# Patient Record
(demographics unavailable — no encounter records)

---

## 2024-10-22 NOTE — HISTORY OF PRESENT ILLNESS
[FreeTextEntry1] : Follow-up [de-identified] : 54 yo M with a hx of insulin dependent DM2, WPW, b/l hearing loss, and weight loss recently hospitalized 5/27-6/4 for DKA and L facial herpes zoster (V1 and V2 dermatome, tx with IV acyclovir followed by PO valacyclovir) with re-presentation on 6/20-6/24 for continued facial rash with pain c/f post-herpetic neuralgia (seen by ID and ophtho, no acute concerns, started on erythromycin eye ointment to left eye BID, and discharged) presenting for follow-up appt.  Today, Mr. Campbell is feeling mostly well. The pain in the left side of his face has decreased greatly since he was hospitalized, although it is still numb with intermittent episodes of pain. Patient has been attending all appointments and trying to take his medications as prescribed, although he states it feels "overwhelming". He was noted to have been feeling depressed at his last visit. Today, he feels less depressed and more hopeful as he is further removed from his hospital stay. However, he continues to experience some "brain fog" which really has not improved since the last time he was seen, He also states that he has not been sleeping the best due to intermittent pain on the left side of his face (in the dermatomes he had shingles). He does not want to try therapy or medication for depression at the moment. Patient states he has been taking his medications as prescribed aside from the midodrine (prescribed for orthostatic hypotension). Although it is prescribed as 3 times a day he states he was told to take it "once a day", and has instead been taking it 1-2 times per week. Patient notes only occasional lightheadedness on this regimen. In regards to his diabetes, he tries to make healthy food choices and has been walking around the neighborhood more recently.

## 2024-10-22 NOTE — HISTORY OF PRESENT ILLNESS
[FreeTextEntry1] : Follow-up [de-identified] : 54 yo M with a hx of insulin dependent DM2, WPW, b/l hearing loss, and weight loss recently hospitalized 5/27-6/4 for DKA and L facial herpes zoster (V1 and V2 dermatome, tx with IV acyclovir followed by PO valacyclovir) with re-presentation on 6/20-6/24 for continued facial rash with pain c/f post-herpetic neuralgia (seen by ID and ophtho, no acute concerns, started on erythromycin eye ointment to left eye BID, and discharged) presenting for follow-up appt.  Today, Mr. Campbell is feeling mostly well. The pain in the left side of his face has decreased greatly since he was hospitalized, although it is still numb with intermittent episodes of pain. Patient has been attending all appointments and trying to take his medications as prescribed, although he states it feels "overwhelming". He was noted to have been feeling depressed at his last visit. Today, he feels less depressed and more hopeful as he is further removed from his hospital stay. However, he continues to experience some "brain fog" which really has not improved since the last time he was seen, He also states that he has not been sleeping the best due to intermittent pain on the left side of his face (in the dermatomes he had shingles). He does not want to try therapy or medication for depression at the moment. Patient states he has been taking his medications as prescribed aside from the midodrine (prescribed for orthostatic hypotension). Although it is prescribed as 3 times a day he states he was told to take it "once a day", and has instead been taking it 1-2 times per week. Patient notes only occasional lightheadedness on this regimen. In regards to his diabetes, he tries to make healthy food choices and has been walking around the neighborhood more recently.

## 2024-10-22 NOTE — PHYSICAL EXAM
[No Acute Distress] : no acute distress [Cachectic] : cachexia was observed [Normal Sclera/Conjunctiva] : normal sclera/conjunctiva [EOMI] : extraocular movements intact [Normal Outer Ear/Nose] : the outer ears and nose were normal in appearance [No Respiratory Distress] : no respiratory distress  [No Accessory Muscle Use] : no accessory muscle use [Clear to Auscultation] : lungs were clear to auscultation bilaterally [Normal Rate] : normal rate  [Regular Rhythm] : with a regular rhythm [No Murmur] : no murmur heard [No Edema] : there was no peripheral edema [Soft] : abdomen soft [Non Tender] : non-tender [Non-distended] : non-distended [Grossly Normal Strength/Tone] : grossly normal strength/tone [No Rash] : no rash [Coordination Grossly Intact] : coordination grossly intact [No Focal Deficits] : no focal deficits [Normal Gait] : normal gait [Speech Grossly Normal] : speech grossly normal [Normal Affect] : the affect was normal [Alert and Oriented x3] : oriented to person, place, and time [de-identified] : Sensation and motor grossly intact bilaterally

## 2024-10-22 NOTE — REVIEW OF SYSTEMS
[Fatigue] : fatigue [Depression] : depression [Negative] : Heme/Lymph [de-identified] : Left facial pain and numbness

## 2024-10-22 NOTE — ASSESSMENT
[FreeTextEntry1] : 52 yo M with a hx of insulin dependent DM2, WPW, b/l hearing loss, and weight loss recently hospitalized 5/27-6/4 for DKA and L facial herpes zoster (V1 and V2 dermatome, tx with IV acyclovir followed by PO valacyclovir) with re-presentation on 6/20-6/24 for continued facial rash with pain c/f post-herpetic neuralgia (seen by ID and ophtho, no acute concerns, started on erythromycin eye ointment to left eye BID, and discharged) presenting for follow-up appt.  #Diabetes Mellitus - Hx of DM2 x7 years - A1c 8.4 on 7/12. Patient has appt with endocrinologist in days, will re-check at that time - Current regimen: Tresiba 5U QHS, and Novolog 4U AC. Patient endorses compliance - Microalbumin nml recently, will not repeat - F/u endocrine reccs regarding insulin regimen - Patient follows closely with outpt ophthalmologist  #Depression -Improved from prior -Patient does not want medication or therapy at this time -Fatigue, brain fog, insomnia likely result of depression in s/o negative work-up at last visit  #Herpes zoster -Pain improved but still present -Patient follows closely with neurologist and ophthalmologist  #Orthostatic hypotension -Patient prescribed midodrine at most recent hospitalization for orthostatic hypotension -Patient not taking medication as prescribed (1-2 times per week) with slight symptoms of lightheadedness -Counseled patient to continue taking the medication as he has been and keep a blood pressure log -Will re-visit whether to continue midodrine at next visit  #HCM -Medication refills sent to pharmacy -RTC in 3 months

## 2024-10-22 NOTE — END OF VISIT
[] : Resident [FreeTextEntry3] : Discussed midodrine with pharmacy team, will follow up with patient closely. Patient counseled on when to escalate care to ED.

## 2024-10-22 NOTE — PHYSICAL EXAM
[No Acute Distress] : no acute distress [Cachectic] : cachexia was observed [Normal Sclera/Conjunctiva] : normal sclera/conjunctiva [EOMI] : extraocular movements intact [Normal Outer Ear/Nose] : the outer ears and nose were normal in appearance [No Respiratory Distress] : no respiratory distress  [No Accessory Muscle Use] : no accessory muscle use [Clear to Auscultation] : lungs were clear to auscultation bilaterally [Normal Rate] : normal rate  [Regular Rhythm] : with a regular rhythm [No Murmur] : no murmur heard [No Edema] : there was no peripheral edema [Soft] : abdomen soft [Non Tender] : non-tender [Non-distended] : non-distended [Grossly Normal Strength/Tone] : grossly normal strength/tone [No Rash] : no rash [Coordination Grossly Intact] : coordination grossly intact [No Focal Deficits] : no focal deficits [Normal Gait] : normal gait [Speech Grossly Normal] : speech grossly normal [Normal Affect] : the affect was normal [Alert and Oriented x3] : oriented to person, place, and time [de-identified] : Sensation and motor grossly intact bilaterally

## 2024-10-22 NOTE — REVIEW OF SYSTEMS
[Fatigue] : fatigue [Depression] : depression [Negative] : Heme/Lymph [de-identified] : Left facial pain and numbness

## 2024-10-22 NOTE — ASSESSMENT
[FreeTextEntry1] : 54 yo M with a hx of insulin dependent DM2, WPW, b/l hearing loss, and weight loss recently hospitalized 5/27-6/4 for DKA and L facial herpes zoster (V1 and V2 dermatome, tx with IV acyclovir followed by PO valacyclovir) with re-presentation on 6/20-6/24 for continued facial rash with pain c/f post-herpetic neuralgia (seen by ID and ophtho, no acute concerns, started on erythromycin eye ointment to left eye BID, and discharged) presenting for follow-up appt.  #Diabetes Mellitus - Hx of DM2 x7 years - A1c 8.4 on 7/12. Patient has appt with endocrinologist in days, will re-check at that time - Current regimen: Tresiba 5U QHS, and Novolog 4U AC. Patient endorses compliance - Microalbumin nml recently, will not repeat - F/u endocrine reccs regarding insulin regimen - Patient follows closely with outpt ophthalmologist  #Depression -Improved from prior -Patient does not want medication or therapy at this time -Fatigue, brain fog, insomnia likely result of depression in s/o negative work-up at last visit  #Herpes zoster -Pain improved but still present -Patient follows closely with neurologist and ophthalmologist  #Orthostatic hypotension -Patient prescribed midodrine at most recent hospitalization for orthostatic hypotension -Patient not taking medication as prescribed (1-2 times per week) with slight symptoms of lightheadedness -Counseled patient to continue taking the medication as he has been and keep a blood pressure log -Will re-visit whether to continue midodrine at next visit  #HCM -Medication refills sent to pharmacy -RTC in 3 months

## 2024-10-24 NOTE — ASSESSMENT
[FreeTextEntry1] : 53y M with Hx DM2 (insulin-dependent, A1c 10.0 in 5/2024), WPW, hearing loss, and L facial Herpes Zoster Ophthalmicus c/b post herpetic neuralgia, presenting for follow up regarding management of post-herptic neuralgia  Impression: Slight improvement in both numbness and pain related to post herpetic neuralgia.  Recommendations: [] Discussed possibility of increasing frequency and/or dose of Gabapentin (pt currently taking 600mg BID). At this time, pt opts to continue taking 600mg BID. [] Continue to follow up with PCP and ophthalmology [] RTC in 3 months  Case discussed with neurology attending Dr. Sunshine Frederick.

## 2024-10-24 NOTE — HISTORY OF PRESENT ILLNESS
[FreeTextEntry1] : Follow up Neurology Visit 10/24/2024 Pt reports slight improvement in both L facial numbness (most prominent in L V2) and L facial pain (it comes and goes, is electric shock like, comes more often at night than during day), but both features are still present. Pt is taking Gabapentin 600mg BID, he states it makes him somewhat drowsy, but he is happy to continue that current regimen and would not like to change it at this time. No new interval neurologic complaints.  Initial Neurology Visit 8/22/2024 53y M with Hx b/l hearing loss (wears aids), DM2 (insulin dependent, A1c 5/2024 10.0), WPW, and post-herpetic L facial neuralgia. Patient has not seen doctor for many years and was non-compliant with insulin. Presented to hospital on 5/27/24 for L facial pain. Started as a pimple below the L eye and then painful rash spread onto L cheek (V2), L forehead (V1), and L temporal scalp. He describes it as a constant throbbing behind the L eye with electric shocks into the cheek, forehead, and scalp. He also has pain in L eye when he looks left or right. He endorses numbness below the L eye and along the L jawline. He was treated with IV acyclovir, followed by PO valcyclovir, then discharged. However, he presented to hospital and was admitted a second time due to intractable pain. Started on gabapentin 300mg TID and eye drops. He was also discharged on midodrine 5mg TID (for idiopathic orthostatic hypotension), daily multivitamin, Tresiba 10u qHS, and Novolog. He presented to establish care with internist Dr Childers on 7/12/24 and was started on Lipitor 10mg daily. Referred for colonoscopy, serum studies, endocrinology, and neurology evaluations. Since the start of the herpes infection, he endorses intermittent blurriness in the L eye. Lasts for hours. He does wear corrective glasses at baseline. He states that the L facial pain interferes with his sleep and is only partially relieved by gabapentin or Tylenol.  He denies any surgeries, allergies to medications, smoking/alcohol use. He lives alone and is unemployed. Formerly worked for [employer].

## 2024-10-24 NOTE — HISTORY OF PRESENT ILLNESS
[FreeTextEntry1] : Follow up Neurology Visit 10/24/2024 Pt reports slight improvement in both L facial numbness (most prominent in L V2) and L facial pain (it comes and goes, is electric shock like, comes more often at night than during day), but both features are still present. Pt is taking Gabapentin 600mg BID, he states it makes him somewhat drowsy, but he is happy to continue that current regimen and would not like to change it at this time. No new interval neurologic complaints.  Initial Neurology Visit 8/22/2024 53y M with Hx b/l hearing loss (wears aids), DM2 (insulin dependent, A1c 5/2024 10.0), WPW, and post-herpetic L facial neuralgia. Patient has not seen doctor for many years and was non-compliant with insulin. Presented to hospital on 5/27/24 for L facial pain. Started as a pimple below the L eye and then painful rash spread onto L cheek (V2), L forehead (V1), and L temporal scalp. He describes it as a constant throbbing behind the L eye with electric shocks into the cheek, forehead, and scalp. He also has pain in L eye when he looks left or right. He endorses numbness below the L eye and along the L jawline. He was treated with IV acyclovir, followed by PO valcyclovir, then discharged. However, he presented to hospital and was admitted a second time due to intractable pain. Started on gabapentin 300mg TID and eye drops. He was also discharged on midodrine 5mg TID (for idiopathic orthostatic hypotension), daily multivitamin, Tresiba 10u qHS, and Novolog. He presented to establish care with internist Dr Childers on 7/12/24 and was started on Lipitor 10mg daily. Referred for colonoscopy, serum studies, endocrinology, and neurology evaluations. Since the start of the herpes infection, he endorses intermittent blurriness in the L eye. Lasts for hours. He does wear corrective glasses at baseline. He states that the L facial pain interferes with his sleep and is only partially relieved by gabapentin or Tylenol.  He denies any surgeries, allergies to medications, smoking/alcohol use. He lives alone and is unemployed. Formerly worked for [employer].

## 2024-10-24 NOTE — PHYSICAL EXAM
[Person] : oriented to person [Place] : oriented to place [Time] : oriented to time [Concentration Intact] : normal concentrating ability [Visual Intact] : visual attention was ~T not ~L decreased [Naming Objects] : no difficulty naming common objects [Fluency] : fluency intact [Comprehension] : comprehension intact [Past History] : adequate knowledge of personal past history [Cranial Nerves Oculomotor (III)] : extraocular motion intact [Cranial Nerves Facial (VII)] : face symmetrical [Cranial Nerves Glossopharyngeal (IX)] : tongue and palate midline [Cranial Nerves Accessory (XI - Cranial And Spinal)] : head turning and shoulder shrug symmetric [Cranial Nerves Hypoglossal (XII)] : there was no tongue deviation with protrusion [Facial Sensation Decrease - V1 Left Only] : decreased over the forehead and anterior scalp [Facial Sensation Decrease - V2 Left Only] : decreased over the side of the nose, infraorbital area, and upper lip [Facial Sensation Decrease - V3 Left Only] : decreased over the chin and lower lip [Motor Tone] : muscle tone was normal in all four extremities [Motor Strength] : muscle strength was normal in all four extremities [No Muscle Atrophy] : normal bulk in all four extremities [Sensation Tactile Decrease] : light touch was intact [Abnormal Walk] : normal gait [Balance] : balance was intact [2+] : Ankle jerk left 2+ [Past-pointing] : there was no past-pointing [Plantar Reflex Right Only] : normal on the right [Plantar Reflex Left Only] : normal on the left

## 2025-02-03 NOTE — HEALTH RISK ASSESSMENT
[Fair] :  ~his/her~ mood as fair [No] : No [Never] : Never [Alone] : lives alone [Unemployed] : unemployed [Feels Safe at Home] : Feels safe at home

## 2025-02-03 NOTE — PHYSICAL EXAM
[No Acute Distress] : no acute distress [Cachectic] : cachexia was observed [Normal Sclera/Conjunctiva] : normal sclera/conjunctiva [EOMI] : extraocular movements intact [Normal Outer Ear/Nose] : the outer ears and nose were normal in appearance [No Respiratory Distress] : no respiratory distress  [No Accessory Muscle Use] : no accessory muscle use [Clear to Auscultation] : lungs were clear to auscultation bilaterally [Normal Rate] : normal rate  [Regular Rhythm] : with a regular rhythm [No Murmur] : no murmur heard [No Edema] : there was no peripheral edema [Soft] : abdomen soft [Non Tender] : non-tender [Non-distended] : non-distended [Grossly Normal Strength/Tone] : grossly normal strength/tone [No Rash] : no rash [Coordination Grossly Intact] : coordination grossly intact [Normal Gait] : normal gait [Speech Grossly Normal] : speech grossly normal [Normal Affect] : the affect was normal [Alert and Oriented x3] : oriented to person, place, and time

## 2025-02-04 NOTE — REVIEW OF SYSTEMS
[Negative] : Heme/Lymph [Recent Change In Weight] : ~T no recent weight change [FreeTextEntry4] : Left facial pain, numbness intermittently

## 2025-02-04 NOTE — ASSESSMENT
[FreeTextEntry1] : 52 yo M with a hx of insulin dependent DM2, WPW, b/l hearing loss, and weight loss recently hospitalized 5/27-6/4 for DKA and L facial herpes zoster (V1 and V2 dermatome, tx with IV acyclovir followed by PO valacyclovir) with re-presentation on 6/20-6/24 for continued facial rash with pain c/f post-herpetic neuralgia (seen by ID and ophtho, no acute concerns, started on erythromycin eye ointment to left eye BID, and discharged) presenting today for CPE.  #Diabetes Mellitus - Hx of DM2 x7 years - A1c 8.4 on 7/12 - Current regimen: Tresiba 5U QHS, and Novolog 4U AC. Patient endorses compliance - Home BGL readings--> 130-150 - Refills of all DM medications and supplies sent - Podiatry referral sent, endocrine appt in June - Patient follows closely with outpt ophthalmologist - Repeat A1C, urine P/C ratio today  #Herpes zoster -Pain improved but still present -Patient follows closely with neurologist and ophthalmologist -C/w gabapentin 600 BID  #Weight loss -No recent changes in weight -Consulted health home team for assistance. Patient with food insecurity given current unemployment -Consulted nutrition to optimize diet -Would appreciate endocrinology input regarding weight loss  #Orthostatic hypotension -Patient prescribed midodrine at most recent hospitalization for orthostatic hypotension -Patient currently taking medication 2-3 times per day -Blood pressure log showing mostly pressures in 90s/60s -Advised patient to continue taking midodrine and continue logging blood pressures -Will re-assess at next visit  #Depression -Patient feeling as though his depression has improved since last visit. He feels it is in the setting of recent medical issues -He does not want to start therapy or medication at this time  #HCM -Colonoscopy done 5 months ago and WNL per patient -Tdap given at this appt -Patient would like to defer shingrix vaccine to next appt -Screening labs: CBC, CMP, lipids, A1C, urine P/C  RTC in 2 months Discussed with Dr. Holden

## 2025-02-04 NOTE — HISTORY OF PRESENT ILLNESS
[FreeTextEntry1] : CPE [de-identified] : 52 yo M with a hx of insulin dependent DM2, WPW, b/l hearing loss, and weight loss recently hospitalized 5/27-6/4 for DKA and L facial herpes zoster (V1 and V2 dermatome, tx with IV acyclovir followed by PO valacyclovir) with re-presentation on 6/20-6/24 for continued facial rash with pain c/f post-herpetic neuralgia (seen by ID and ophtho, no acute concerns, started on erythromycin eye ointment to left eye BID, and discharged) presenting today for CPE.  Today, patient states that he is still having some L-sided facial pain and numbness. The symptoms are intermittent and overall decreased from prior but still bother him from time to time. He is taking his gabapentin that was prescribed by neurology but was unable to follow-up with them for his most recent appointment. Additionally, patient has been taking his insulin as prescribed and notes that his sugars have been in the 130s-150s pre-meal. He has also been taking the midodrine he was prescribed on discharge from the hospital months ago. Patient reports taking it 2-3 times per day and his logged blood pressures have been mostly 90s/60s-70s. He is mostly asymptomatic, but sometimes feels lightheaded when standing and walking.

## 2025-02-04 NOTE — HISTORY OF PRESENT ILLNESS
[FreeTextEntry1] : CPE [de-identified] : 52 yo M with a hx of insulin dependent DM2, WPW, b/l hearing loss, and weight loss recently hospitalized 5/27-6/4 for DKA and L facial herpes zoster (V1 and V2 dermatome, tx with IV acyclovir followed by PO valacyclovir) with re-presentation on 6/20-6/24 for continued facial rash with pain c/f post-herpetic neuralgia (seen by ID and ophtho, no acute concerns, started on erythromycin eye ointment to left eye BID, and discharged) presenting today for CPE.  Today, patient states that he is still having some L-sided facial pain and numbness. The symptoms are intermittent and overall decreased from prior but still bother him from time to time. He is taking his gabapentin that was prescribed by neurology but was unable to follow-up with them for his most recent appointment. Additionally, patient has been taking his insulin as prescribed and notes that his sugars have been in the 130s-150s pre-meal. He has also been taking the midodrine he was prescribed on discharge from the hospital months ago. Patient reports taking it 2-3 times per day and his logged blood pressures have been mostly 90s/60s-70s. He is mostly asymptomatic, but sometimes feels lightheaded when standing and walking.

## 2025-02-28 NOTE — END OF VISIT
[] : Resident [FreeTextEntry3] : still has pain on his face. start duloxetine continue gabapentin if next visit, not better will switch to pregabalin [Time Spent: ___ minutes] : I have spent [unfilled] minutes of time on the encounter which excludes teaching and separately reported services.

## 2025-02-28 NOTE — DISCUSSION/SUMMARY
[FreeTextEntry1] : 53y M with Hx DM2 (insulin-dependent, A1c 10.0 in 5/2024), WPW, hearing loss, and L facial Herpes Zoster Ophthalmicus c/b post herpetic neuralgia, presenting for follow up regarding management of post-herptic neuralgia  Impression: Slight improvement in both numbness and pain related to post herpetic neuralgia, but patient continues to have significant enough pain/paresthesias and feels it impacts his quality of life.  Recommendations: [] Discussed possibility of increasing frequency and/or dose of Gabapentin (pt currently taking 600mg BID). At this time, pt opts to continue taking 600mg BID. [] Start duloxetine 30mg once a day; patient advised as to possible sedating/activating side effects. [] Continue to follow up with PCP and ophthalmology [] RTC in 3 months  Case discussed with neurology attending Dr. Garcia.

## 2025-02-28 NOTE — PHYSICAL EXAM
[FreeTextEntry1] : Neurologic:   Orientation: oriented to person, oriented to place and oriented to time.   Attention: normal concentrating ability and visual attention was not decreased.   Language: no difficulty naming common objects, and comprehension intact.  Seems to have some dysarthria at times Fund of knowledge: displays adequate knowledge of personal past history.   Cranial Nerves: extraocular motion intact, face symmetrical, tongue and palate midline, head turning and shoulder shrug symmetric and there was no tongue deviation with protrusion.   Cranial nerve II: (Pupils reactive b/l, VFF).   Cranial nerve V:  Left face: sensation to LT and PP was decreased over the forehead and anterior scalp, decreased over the side of the nose, infraorbital area, and upper lip and decreased over the chin and lower lip. (light touch and PP decreased sensation in L V2) compared to the R side.  pinprick - decreased sensation in L V1-V3 (but most significant in V2)).   Symettric sensation to LT in arms and legs Motor: muscle tone was normal in all four extremities, muscle strength was normal in all four extremities and normal bulk in all four extremities.   Sensory exam: light touch was intact.   Coordination:. normal gait. balance was intact. there was no past-pointing.   Deep tendon reflexes: Biceps right 2+. Biceps left 2+.  Triceps right 2+. Triceps left 2+.  Brachioradialis right 2+. Brachioradialis left 2+.  Patella right 0+. Patella left 0+.  Ankle jerk right 1+. Ankle jerk left 1+.

## 2025-02-28 NOTE — HISTORY OF PRESENT ILLNESS
[FreeTextEntry1] : 2/27/25 Patient presents to neuro clinic for follow up appt. 54 yo M, with DMII, insulin dependent, diabetic retinopathy and macular edema, b/l hearing loss, HLD, low BP, who had presented to the hospital for shingles May 2024, and then admitted again a second time for intractable pain. He reports some minimal improvement in his L face numbness and pain and paresthesias, but is still bothered by the symptoms. Patient states he feels stabbing sensation in his L eye at times and he has difficulty with speech because his L lip feels numb. Patient has been following with PCP for diabetes and ophthalmology for eye symptoms. Patient is currently on atorvastatin 10mg, midodrine 5mg, and gabapentin 300mg 2 pills twice a day. Patient reports gabapentin helps somewhat with his symptoms but he doesn't want to increase the gabapentin further. He is open to trying a second medication. Patient also reports some stiffness with turning his neck to the left side.   Follow up Neurology Visit 10/24/2024 Pt reports slight improvement in both L facial numbness (most prominent in L V2) and L facial pain (it comes and goes, is electric shock like, comes more often at night than during day), but both features are still present. Pt is taking Gabapentin 600mg BID, he states it makes him somewhat drowsy, but he is happy to continue that current regimen and would not like to change it at this time. No new interval neurologic complaints.  Initial Neurology Visit 8/22/2024 53y M with Hx b/l hearing loss (wears aids), DM2 (insulin dependent, A1c 5/2024 10.0), WPW, and post-herpetic L facial neuralgia. Patient has not seen doctor for many years and was non-compliant with insulin. Presented to hospital on 5/27/24 for L facial pain. Started as a pimple below the L eye and then painful rash spread onto L cheek (V2), L forehead (V1), and L temporal scalp. He describes it as a constant throbbing behind the L eye with electric shocks into the cheek, forehead, and scalp. He also has pain in L eye when he looks left or right. He endorses numbness below the L eye and along the L jawline. He was treated with IV acyclovir, followed by PO valcyclovir, then discharged. However, he presented to hospital and was admitted a second time due to intractable pain. Started on gabapentin 300mg TID and eye drops. He was also discharged on midodrine 5mg TID (for idiopathic orthostatic hypotension), daily multivitamin, Tresiba 10u qHS, and Novolog. He presented to [facility] care with internist Dr Childers on 7/12/24 and was started on Lipitor 10mg daily. Referred for colonoscopy, serum studies, endocrinology, and neurology evaluations. Since the start of the herpes infection, he endorses intermittent blurriness in the L eye. Lasts for hours. He does wear corrective glasses at baseline. He states that the L facial pain interferes with his sleep and is only partially relieved by gabapentin or Tylenol.  He denies any surgeries, allergies to medications, smoking/alcohol use. He lives alone and is unemployed. Formerly worked for [employer].

## 2025-02-28 NOTE — HISTORY OF PRESENT ILLNESS
[FreeTextEntry1] : 2/27/25 Patient presents to neuro clinic for follow up appt. 52 yo M, with DMII, insulin dependent, diabetic retinopathy and macular edema, b/l hearing loss, HLD, low BP, who had presented to the hospital for shingles May 2024, and then admitted again a second time for intractable pain. He reports some minimal improvement in his L face numbness and pain and paresthesias, but is still bothered by the symptoms. Patient states he feels stabbing sensation in his L eye at times and he has difficulty with speech because his L lip feels numb. Patient has been following with PCP for diabetes and ophthalmology for eye symptoms. Patient is currently on atorvastatin 10mg, midodrine 5mg, and gabapentin 300mg 2 pills twice a day. Patient reports gabapentin helps somewhat with his symptoms but he doesn't want to increase the gabapentin further. He is open to trying a second medication. Patient also reports some stiffness with turning his neck to the left side.   Follow up Neurology Visit 10/24/2024 Pt reports slight improvement in both L facial numbness (most prominent in L V2) and L facial pain (it comes and goes, is electric shock like, comes more often at night than during day), but both features are still present. Pt is taking Gabapentin 600mg BID, he states it makes him somewhat drowsy, but he is happy to continue that current regimen and would not like to change it at this time. No new interval neurologic complaints.  Initial Neurology Visit 8/22/2024 53y M with Hx b/l hearing loss (wears aids), DM2 (insulin dependent, A1c 5/2024 10.0), WPW, and post-herpetic L facial neuralgia. Patient has not seen doctor for many years and was non-compliant with insulin. Presented to hospital on 5/27/24 for L facial pain. Started as a pimple below the L eye and then painful rash spread onto L cheek (V2), L forehead (V1), and L temporal scalp. He describes it as a constant throbbing behind the L eye with electric shocks into the cheek, forehead, and scalp. He also has pain in L eye when he looks left or right. He endorses numbness below the L eye and along the L jawline. He was treated with IV acyclovir, followed by PO valcyclovir, then discharged. However, he presented to hospital and was admitted a second time due to intractable pain. Started on gabapentin 300mg TID and eye drops. He was also discharged on midodrine 5mg TID (for idiopathic orthostatic hypotension), daily multivitamin, Tresiba 10u qHS, and Novolog. He presented to [facility] care with internist Dr Childers on 7/12/24 and was started on Lipitor 10mg daily. Referred for colonoscopy, serum studies, endocrinology, and neurology evaluations. Since the start of the herpes infection, he endorses intermittent blurriness in the L eye. Lasts for hours. He does wear corrective glasses at baseline. He states that the L facial pain interferes with his sleep and is only partially relieved by gabapentin or Tylenol.  He denies any surgeries, allergies to medications, smoking/alcohol use. He lives alone and is unemployed. Formerly worked for [employer].

## 2025-04-18 NOTE — PHYSICAL EXAM
[No Acute Distress] : no acute distress [Well-Appearing] : well-appearing [Normal Sclera/Conjunctiva] : normal sclera/conjunctiva [EOMI] : extraocular movements intact [Normal Outer Ear/Nose] : the outer ears and nose were normal in appearance [No Respiratory Distress] : no respiratory distress  [Normal Rate] : normal rate  [Regular Rhythm] : with a regular rhythm [No Edema] : there was no peripheral edema [Soft] : abdomen soft [Non Tender] : non-tender [Non-distended] : non-distended [Grossly Normal Strength/Tone] : grossly normal strength/tone [No Rash] : no rash [Coordination Grossly Intact] : coordination grossly intact [Normal Gait] : normal gait [Speech Grossly Normal] : speech grossly normal [Normal Affect] : the affect was normal [Alert and Oriented x3] : oriented to person, place, and time

## 2025-04-18 NOTE — ASSESSMENT
[FreeTextEntry1] : 52 yo M with a hx of insulin dependent DM2, WPW, b/l hearing loss, and weight loss recently hospitalized 5/27-6/4 for DKA and L facial herpes zoster (V1 and V2 dermatome, tx with IV acyclovir followed by PO valacyclovir) with re-presentation on 6/20-6/24 for continued facial rash with pain c/f post-herpetic neuralgia presenting today for follow-up appointment.   #Herpes Zoster with post-herpetic neuralgia -Patient follows closely with neurologist and ophthalmologist -C/w duloxetine 30mg qd -Gabapentin dose increased to 600mg in morning and 800mg in evening -Topical lidocaine prescribed for persistent nerve pain  #Low blood pressure -BP initially 98/20. On repeat with manual cuff improved to 102/63 -Asymptomatic in clinic -Patient with consistently low blood pressure and orthostatic hypotension -C/w midodrine 5mg TID -Patient asked to keep blood pressure log and bring into clinic at next visit  #Diabetes Mellitus - Hx of DM2 x7 years - A1c most recently 7.9 consistent with last reading - Given that patient could not receive his insulin due to insurance issues and only re-started it recently, A1C will likely decrease with same regimen - Continue with current regimen: Tresiba 5U QHS, and Novolog 4U AC. Patient endorses compliance - Home BGL readings--> 120-130 - Refills of all DM medications and supplies sent - Podiatry referral sent, endocrine appt in June - Patient follows closely with outpt ophthalmologist

## 2025-04-18 NOTE — END OF VISIT
[FreeTextEntry3] : 30 minutes of total time was spent on the date of the encounter. This included time for review of medical records and laboratory results, face to face time (including the physical examination counseling and coordination of care), time for patient education, treatment plans, answering questions, communicating with other doctors and for medical record documentation.  The time spent is separate from time spent on separately billed procedures.      [] : Resident [Time Spent: ___ minutes] : I have spent [unfilled] minutes of time on the encounter which excludes teaching and separately reported services.

## 2025-04-18 NOTE — HISTORY OF PRESENT ILLNESS
[FreeTextEntry1] : RPA [de-identified] : 52 yo M with a hx of insulin dependent DM2, WPW, b/l hearing loss, and weight loss recently hospitalized 5/27-6/4 for DKA and L facial herpes zoster (V1 and V2 dermatome, tx with IV acyclovir followed by PO valacyclovir) with re-presentation on 6/20-6/24 for continued facial rash with pain c/f post-herpetic neuralgia presenting today for follow-up appointment. At last appt, A1C had improved from 8.4 to 7.9 and he was continued on his regimen of Tresiba 5u and novolog 4u tid. He was recently seen by his neurologist and gabapentin 600mg BID was continued and duloxetine 30mg qd was added.  Today, patient continues to have pain on the L side of his face along with periods of numbness. He states that the pain often keeps him up at night. He has now been using the duloxetine prescribed by neurology for approximately 1 month without appreciating significant pain relief. Additionally, he has noticed some dizziness, especially when rising from a seated position. The dizziness does tend to resolve spontaneously after a few moments. He has been taking his blood pressure at home and notes low readings occasionally (systolics in 90-100s). He is still taking midodrine 5mg TID.

## 2025-04-18 NOTE — HISTORY OF PRESENT ILLNESS
[FreeTextEntry1] : RPA [de-identified] : 54 yo M with a hx of insulin dependent DM2, WPW, b/l hearing loss, and weight loss recently hospitalized 5/27-6/4 for DKA and L facial herpes zoster (V1 and V2 dermatome, tx with IV acyclovir followed by PO valacyclovir) with re-presentation on 6/20-6/24 for continued facial rash with pain c/f post-herpetic neuralgia presenting today for follow-up appointment. At last appt, A1C had improved from 8.4 to 7.9 and he was continued on his regimen of Tresiba 5u and novolog 4u tid. He was recently seen by his neurologist and gabapentin 600mg BID was continued and duloxetine 30mg qd was added.  Today, patient continues to have pain on the L side of his face along with periods of numbness. He states that the pain often keeps him up at night. He has now been using the duloxetine prescribed by neurology for approximately 1 month without appreciating significant pain relief. Additionally, he has noticed some dizziness, especially when rising from a seated position. The dizziness does tend to resolve spontaneously after a few moments. He has been taking his blood pressure at home and notes low readings occasionally (systolics in 90-100s). He is still taking midodrine 5mg TID.

## 2025-04-18 NOTE — ASSESSMENT
[FreeTextEntry1] : 54 yo M with a hx of insulin dependent DM2, WPW, b/l hearing loss, and weight loss recently hospitalized 5/27-6/4 for DKA and L facial herpes zoster (V1 and V2 dermatome, tx with IV acyclovir followed by PO valacyclovir) with re-presentation on 6/20-6/24 for continued facial rash with pain c/f post-herpetic neuralgia presenting today for follow-up appointment.   #Herpes Zoster with post-herpetic neuralgia -Patient follows closely with neurologist and ophthalmologist -C/w duloxetine 30mg qd -Gabapentin dose increased to 600mg in morning and 800mg in evening -Topical lidocaine prescribed for persistent nerve pain  #Low blood pressure -BP initially 98/20. On repeat with manual cuff improved to 102/63 -Asymptomatic in clinic -Patient with consistently low blood pressure and orthostatic hypotension -C/w midodrine 5mg TID -Patient asked to keep blood pressure log and bring into clinic at next visit  #Diabetes Mellitus - Hx of DM2 x7 years - A1c most recently 7.9 consistent with last reading - Given that patient could not receive his insulin due to insurance issues and only re-started it recently, A1C will likely decrease with same regimen - Continue with current regimen: Tresiba 5U QHS, and Novolog 4U AC. Patient endorses compliance - Home BGL readings--> 120-130 - Refills of all DM medications and supplies sent - Podiatry referral sent, endocrine appt in June - Patient follows closely with outpt ophthalmologist

## 2025-05-23 NOTE — ASSESSMENT
[FreeTextEntry1] : 54 yo M with a hx of insulin dependent DM2, WPW, b/l hearing loss, and weight loss recently hospitalized 5/27-6/4 for DKA and L facial herpes zoster (V1 and V2 dermatome, tx with IV acyclovir followed by PO valacyclovir) with re-presentation on 6/20-6/24 for continued facial rash with pain c/f post-herpetic neuralgia presenting today for follow-up appointment.   #Herpes Zoster with post-herpetic neuralgia -Patient follows closely with neurologist and ophthalmologist -C/w duloxetine 30mg qd -C/w gabapentin 600mg in morning and 800mg in evening -C/w topical lidocaine for persistent nerve pain -Referred to physiatry for pain managment and possible TENS for nerve pain  #Low blood pressure -BP chronically low -Asymptomatic in clinic -C/w midodrine 5mg TID -F/u blood pressure log  #Diabetes Mellitus - Hx of DM2 x7 years - A1c most recently 7.9 consistent with last reading - Continue with current regimen: Tresiba 5U QHS, and Novolog 4U AC. Patient endorses compliance - Podiatry referral sent, endocrine appt in June - Patient follows closely with outpt ophthalmologist - RTC in 3 months for A1C check

## 2025-05-23 NOTE — HISTORY OF PRESENT ILLNESS
[FreeTextEntry1] : Follow-up [de-identified] : 54 yo M with a hx of insulin dependent DM2, WPW, b/l hearing loss, and weight loss recently hospitalized 5/27-6/4 for DKA and L facial herpes zoster (V1 and V2 dermatome, tx with IV acyclovir followed by PO valacyclovir) with re-presentation on 6/20-6/24 for continued facial rash with pain c/f post-herpetic neuralgia presenting today for follow-up appointment. At last appt, A1C stable at 7.9 and he was continued on his regimen of Tresiba 5u and novolog 4u tid.   Today, patient is doing well. He continues to have some pain on the left side of his face. This is improved from prior but still not completely gone. Otherwise, he has no other complaints. He has been taking all medications as prescribed.

## 2025-05-23 NOTE — PHYSICAL EXAM
[No Acute Distress] : no acute distress [Well-Appearing] : well-appearing [Normal Sclera/Conjunctiva] : normal sclera/conjunctiva [EOMI] : extraocular movements intact [Normal Outer Ear/Nose] : the outer ears and nose were normal in appearance [No Respiratory Distress] : no respiratory distress  [Clear to Auscultation] : lungs were clear to auscultation bilaterally [Normal Rate] : normal rate  [Regular Rhythm] : with a regular rhythm [No Murmur] : no murmur heard [No Edema] : there was no peripheral edema [Soft] : abdomen soft [Non Tender] : non-tender [Non-distended] : non-distended [Grossly Normal Strength/Tone] : grossly normal strength/tone [No Rash] : no rash [Coordination Grossly Intact] : coordination grossly intact [Normal Gait] : normal gait [Speech Grossly Normal] : speech grossly normal [Normal Affect] : the affect was normal [Alert and Oriented x3] : oriented to person, place, and time

## 2025-06-04 NOTE — PHYSICAL EXAM
[Alert] : alert [No Acute Distress] : no acute distress [EOMI] : extra ocular movement intact [Thyroid Not Enlarged] : the thyroid was not enlarged [No Respiratory Distress] : no respiratory distress [No Accessory Muscle Use] : no accessory muscle use [Normal Rate] : heart rate was normal [Regular Rhythm] : with a regular rhythm [No Edema] : no peripheral edema [Not Tender] : non-tender [Not Distended] : not distended [Soft] : abdomen soft [No Stigmata of Cushings Syndrome] : no stigmata of Cushings Syndrome [No Clubbing, Cyanosis] : no clubbing  or cyanosis of the fingernails [Normal Strength/Tone] : muscle strength and tone were normal [No Tremors] : no tremors [Oriented x3] : oriented to person, place, and time [Normal Affect] : the affect was normal [Acanthosis Nigricans] : no acanthosis nigricans [de-identified] : thin  male

## 2025-06-04 NOTE — ASSESSMENT
[Diabetes Foot Care] : diabetes foot care [Long Term Vascular Complications] : long term vascular complications of diabetes [Carbohydrate Consistent Diet] : carbohydrate consistent diet [Importance of Diet and Exercise] : importance of diet and exercise to improve glycemic control, achieve weight loss and improve cardiovascular health [Exercise/Effect on Glucose] : exercise/effect on glucose [Hypoglycemia Management] : hypoglycemia management [Action and use of Insulin] : action and use of short and long-acting insulin [Self Monitoring of Blood Glucose] : self monitoring of blood glucose [Insulin Self-Administration] : insulin self-administration [Injection Technique, Storage, Sharps Disposal] : injection technique, storage, and sharps disposal [Sick-Day Management] : sick-day management [Retinopathy Screening] : Patient was referred to ophthalmology for retinopathy screening [FreeTextEntry1] : 53yo M with longstanding hx of DM, previously off medications with hx of DKA in May 2024, A1c 10% at that time and started on MDI insulin regimen. DEEDEE ab negative. Cpeptide initially low, later improved.  Also with Underweight BMI of 15, orthostatic hypotension on midodrine, hx of Shingles c/b neuropathic pain, Severe DR and macular edema, neuropathy, HLD.   DM Type 2 w/ hx of DKA  A1c 7.9% in April 2025, above goal < 7%  - repeat Cpeptide with BMP  - continue w/ Tresiba 5 units qhs, Novolog 4 units TIDAC for now but advised to inject 10-15 minutes BEFORE eating instead of right after eating.  - SMBG 3-4 x per day, fasting and other times of the day in a staggered manner; keep a log to bring along to next visit. Does not have glucose logs with him for review today. Verbal report from recall as noted above, well controlled per pt, does not correlate with A1c.  - ProCGM placed in office today, patient asked to return in 10 days via mail or drop off to the office for review & further adjustments  - Reviewed with patient BG targets for fasting (80-130mg/dL), premeal (<140mg/dL), 2 hours postprandial (< 180mg/dL) as well as hypoglycemia (<70mg/dL) signs/symptoms/risks & management. - Call office with highs >300s/lows < 70s in BG. - I discussed the importance of avoiding mild hypoglycemia (FS<70 mg/dl) and severe hypoglycemia (FS<55 mg/dl) with the patient. I also discussed hypoglycemia correction with 4 oz of juice or 4 glucose tablets and rechecking FS in 15 minutes. If FS is still low, repeat 4oz juice or 4 glucose tablets and consume 12 grams of carbohydrates.  - Discussed diabetic diet, decreased intake of simple/processed sugars, increase intake of whole foods with protein and fiber. Increase physical activity as tolerated with cardiovascular exercise 150 min/per week consistently and resistance/strength exercise three days per week.  - P/Cr wnl in Feb 2025  - Lipids at goal in Feb 2025.  - Recommended yearly foot exams and home foot precautions. Podiatry referral given. Has neuropathy. Following with Neurology as well. Currently on both Duloxetine & Gabapentin for post herpetic neuropathic pain.  - Recommended at least yearly ophthalmology exams or as recommended by ophtho - UTD, following closely, has severe DR/DM macular edema, reports recently stable.  - Counseling: We discussed diabetes foot care, long term complications of diabetes including but not limited to neuropathy, nephropathy, retinopathy and cardiovascular disease and the benefits of good glycemic control in preventing said complications. We discussed the risks and benefits of diabetes medications as relevant for today, prevention and management of hypoglycemia, importance of medication compliance and blood glucose monitoring.   HLD LDL goal < 70 - c/w Atorvastatin 10mg qhs    Orthostatic hypotension  Low BMI 15 - reports he is currently following with nutrition, eating 3 meals and snacks nowadays but has poor appetite sometimes; reports unexplained weight loss - previously 130lbs > 3 years ago. In recent years, it appears weight is stable although LOW - check tsh, free t4, BMP  - check 8AM AM Cortisol & ACTH level for completeness - low suspicion for primary AI as Na/K levels have been okay on review of prior labs seen in chart/hie. 2AI less likely to cause hemodynamic instability given intact RAAS system  - c/w midodrine as prescribed by PCP/Cardiology - also with hx of WPW      Patient verbalized understanding of recommendations and agrees to plan as detailed above.   Over 60 minutes spent with the patient, complex case, reviewing medical history, records in [facility] from prior hospitalization, discussing recommendations.  RTC in 3 months. Sooner PRN.

## 2025-06-04 NOTE — PHYSICAL EXAM
[Alert] : alert [No Acute Distress] : no acute distress [EOMI] : extra ocular movement intact [Thyroid Not Enlarged] : the thyroid was not enlarged [No Respiratory Distress] : no respiratory distress [No Accessory Muscle Use] : no accessory muscle use [Normal Rate] : heart rate was normal [Regular Rhythm] : with a regular rhythm [No Edema] : no peripheral edema [Not Tender] : non-tender [Not Distended] : not distended [Soft] : abdomen soft [No Stigmata of Cushings Syndrome] : no stigmata of Cushings Syndrome [No Clubbing, Cyanosis] : no clubbing  or cyanosis of the fingernails [Normal Strength/Tone] : muscle strength and tone were normal [No Tremors] : no tremors [Oriented x3] : oriented to person, place, and time [Normal Affect] : the affect was normal [Acanthosis Nigricans] : no acanthosis nigricans [de-identified] : thin  male

## 2025-06-04 NOTE — HISTORY OF PRESENT ILLNESS
[FreeTextEntry1] : 53yo M presenting for initial evaluation of Dm2.   DM2 diagnosed: ~ 10 years ago  hx of DKA in May 2024, admitted for shingles at that time, had also been off all dm meds for more than 8 years at that time.  c-peptide initially low at 0.5 but it improved to 1.1 DEEDEE ab negative in May 2024 (HIE )  prior endocrinologist: n/a   current regimen: Tresiba 5 units QHS, Novolog 4 units TIDAC takes right after he eats    past meds & reason for discontinuation: initially tablets when dx, but had weight loss so stopped the meds shortly after initially dx as he attributed it to the weight loss.   Restarted on insulin after DKA in the hospital/requiring MICU and insulin ggt   HbA1c trend: 10% in May 2024 >> 7.9% in April 2025   SMBG: checks with glucometer , 3x/day  fasting - 125 before lunch 130s dinner 130- 135s  once in a while higher, up to 150-160s.  hypoglycemia? no lows < 70 recently; a few times 90-95   Diet B- sandwich with avocado  L- fish or chicken with veggies  D- pasta with meatball  Snacks- bananas, granola bars  Drinks - water usually, no soda, sometimes apple juice   Exercise/Lifestyle - walking, staying active  unemployed   Complications: No known CAD,CVA, Nephropathy,  ++Retinopathy- severe DR OS> OD /DM macular edema OU - follows w/ ophthalmology & retinal specialist - last visit in April 2025. Has an appt coming up soon.  +Neuropathy follows w/ cardio for WPW  eGFR 105  Optho - utd, follows closely - severe DR/dm macular edema  Pod - +neuropathy, on duloxetine, needs to schedule  gabapentin for neuropathic pain. Follows w/ neurology Dr. Mclain.   on midodrine for orthostatic hypotension - states when he is not eating or drinking it is worse.  Says he lives alone, somedays he is not hungry/just does not have an appetite. No N/V/Abd pain.  BMI 15 - says he used to be 130lbs years ago >> since 2024 - low 90s to 100s.  Says he has an upcoming appt with [facility]    HLD - on Atorvastatin 10mg qhs  LDL 57

## 2025-06-04 NOTE — HISTORY OF PRESENT ILLNESS
[FreeTextEntry1] : 53yo M presenting for initial evaluation of Dm2.   DM2 diagnosed: ~ 10 years ago  hx of DKA in May 2024, admitted for shingles at that time, had also been off all dm meds for more than 8 years at that time.  c-peptide initially low at 0.5 but it improved to 1.1 DEEDEE ab negative in May 2024 (HIE )  prior endocrinologist: n/a   current regimen: Tresiba 5 units QHS, Novolog 4 units TIDAC takes right after he eats    past meds & reason for discontinuation: initially tablets when dx, but had weight loss so stopped the meds shortly after initially dx as he attributed it to the weight loss.   Restarted on insulin after DKA in the hospital/requiring MICU and insulin ggt   HbA1c trend: 10% in May 2024 >> 7.9% in April 2025   SMBG: checks with glucometer , 3x/day  fasting - 125 before lunch 130s dinner 130- 135s  once in a while higher, up to 150-160s.  hypoglycemia? no lows < 70 recently; a few times 90-95   Diet B- sandwich with avocado  L- fish or chicken with veggies  D- pasta with meatball  Snacks- bananas, granola bars  Drinks - water usually, no soda, sometimes apple juice   Exercise/Lifestyle - walking, staying active  unemployed   Complications: No known CAD,CVA, Nephropathy,  ++Retinopathy- severe DR OS> OD /DM macular edema OU - follows w/ ophthalmology & retinal specialist - last visit in April 2025. Has an appt coming up soon.  +Neuropathy follows w/ cardio for WPW  eGFR 105  Optho - utd, follows closely - severe DR/dm macular edema  Pod - +neuropathy, on duloxetine, needs to schedule  gabapentin for neuropathic pain. Follows w/ neurology Dr. Mclain.   on midodrine for orthostatic hypotension - states when he is not eating or drinking it is worse.  Says he lives alone, somedays he is not hungry/just does not have an appetite. No N/V/Abd pain.  BMI 15 - says he used to be 130lbs years ago >> since 2024 - low 90s to 100s.  Says he has an upcoming appt with [facility]    HLD - on Atorvastatin 10mg qhs  LDL 57

## 2025-06-04 NOTE — REVIEW OF SYSTEMS
[As Noted in HPI] : as noted in HPI [All other systems negative] : All other systems negative [Acanthosis] : no acanthosis  [Pain/Numbness of Digits] : pain/numbness of digits [Depression] : no depression [Negative] : Endocrine

## 2025-06-11 NOTE — PHYSICAL EXAM
[FreeTextEntry1] : Constitutional: In NAD, calm and cooperative HEENT: NCAT, Anicteric sclera, no lid-lag Cardio: Extremities appear pink and well perfused, no peripheral edema Respiratory: Normal respiratory effort on room air, no accessory muscle use Skin: residual hypopigmentation from herpes zoster on the left side of face Psych: Normal affect, intact judgment and insight Neuro: Awake, alert and oriented x 3. Allodynia of Left V1, V2, and lesser degree, V3 to light touch.

## 2025-06-11 NOTE — HISTORY OF PRESENT ILLNESS
[FreeTextEntry1] : BOY VO is a 53 yo M with a hx of insulin dependent DM2, WPW, b/l hearing loss, and weight loss recently hospitalized 5/27-6/4 for DKA and L facial herpes zoster (V1 and V2 dermatome, tx with IV acyclovir followed by PO valacyclovir) with re-presentation on 6/20-6/24 for continued facial rash with pain c/f post-herpetic neuralgia presenting today for an evaluation of left facial pain.   Location: V1, V2, and V3 distribution on the left Onset: since when he developed herpes zoster on the left face. Pain persisted despite IV acyclovir aned valacyclovir. He has been seeing neurology for this for years. Is on duloxetine, gabapentin, and topical lidocaine.  Provocation/Palliative: constant pain -- win blowing, talking, etc. make it worse, nothing makes it better. Quality: lancinating, pins and needle.  Radiation: Along V1,2,3  distribution Severity: 8/10-10/10 at its worst  Timing: constant Associated Sxs:   Endorses associated numbness or tingling of the left side of his face. Denies associated extremity weakness. Denies any loss of bowel/bladder control or any saddle anesthesia.

## 2025-06-11 NOTE — ASSESSMENT
[FreeTextEntry1] : BOY VO is a 53 yo M with a hx of insulin dependent DM2, WPW, b/l hearing loss, and weight loss recently hospitalized 5/27-6/4 for DKA and L facial herpes zoster (V1 and V2 dermatome, tx with IV acyclovir followed by PO valacyclovir) with re-presentation on 6/20-6/24 for continued facial rash with pain c/f post-herpetic neuralgia presenting today for an evaluation of left facial pain.   Based on the patient's history, presentation, physical exam, and imaging findings, the patient's pain is most likely due to Post-Herpetic Neuralgia of the Left Trigeminal Nerves.   Their pain was refractory to conservative management with heat/ice, activity modifications, rest, prescription medication management, OTC Analgesics (such as OTC NSAIDs or Acetaminophen). Currently his medication is managed by his neurologist.  Plan: - I offered the patient an intranasal sphenopalatine ganglion block for trigeminal neuralgia, but unfortunately I do not perform interventional injections of the trigeminal nerve as part of my practice.   - I offered the patient a referral to Dr. Antonio Sánchez, a Pain Managment physician who is additionally subspecialized in Headache medicine for evaluation and treatment of Post-Herpetic Neuralgia of the Left Trigeminal Nerves. The patient accepts.  - Follow up not needed with me, as I cannot provide the interventional treatment option he seeks.

## 2025-06-12 NOTE — REVIEW OF SYSTEMS
[Numbness] : numbness [Tingling] : tingling [Hypersensitivity] : hypersensitivity [Negative] : Psychiatric

## 2025-06-13 NOTE — DISCUSSION/SUMMARY
[FreeTextEntry1] : 54y M with Hx DM2 (insulin-dependent, A1c 10.0 in 5/2024), WPW, hearing loss, and L facial Herpes Zoster Ophthalmicus c/b post herpetic neuralgia, presenting for follow up regarding management of post-herptic neuralgia. Currently minimally controlled on gabapentin 600/800 and duloxetine 30mg Qd. Pending possible sphenopalantine block.   Impression: Slight improvement. LV1-3 hyperesthesia 2/2 post herpetic neuralgia  PLAN  [] Sent email to Dr. Sánchez to schedule patient for sphenopalatine block  [] Increase gabapentin to 600mg TID  [] Increase duloxetine 60mg Qd  [] RTC in 6 months  Case discussed with neurology attending Dr. Garcia.

## 2025-06-13 NOTE — PHYSICAL EXAM
[General Appearance - Alert] : alert [Oriented To Time, Place, And Person] : oriented to person, place, and time [Person] : oriented to person [Place] : oriented to place [Time] : oriented to time [Cranial Nerves Optic (II)] : visual acuity intact bilaterally,  visual fields full to confrontation, pupils equal round and reactive to light [Cranial Nerves Oculomotor (III)] : extraocular motion intact [Cranial Nerves Facial (VII)] : face symmetrical [Cranial Nerves Glossopharyngeal (IX)] : tongue and palate midline [Cranial Nerves Accessory (XI - Cranial And Spinal)] : head turning and shoulder shrug symmetric [Cranial Nerves Hypoglossal (XII)] : there was no tongue deviation with protrusion [Motor Tone] : muscle tone was normal in all four extremities [Motor Strength] : muscle strength was normal in all four extremities [Hyperesthesia] : hyperesthesia was present [Abnormal Walk] : normal gait [Balance] : balance was intact [0] : Triceps left 0 [1+] : Ankle jerk left 1+ [FreeTextEntry1] : Slight depressed affect and mood  [Romberg's Sign] : Romberg's sign was negtive [Plantar Reflex Right Only] : normal on the right [Plantar Reflex Left Only] : normal on the left [___] : absent on the right [___] : absent on the left

## 2025-06-13 NOTE — HISTORY OF PRESENT ILLNESS
[FreeTextEntry1] : 6/12/25 Patient presents to neuro clinic for follow up appointment. Continues to demonstrate L V1-V3 facial pain which affects him daily. He is unable to sleep on his left side and speaking to others continues to be difficult for him. Currently feels like gabapentin and duloxetine are helping him a little bit with the frequency of the pain but feels like they come back in the middle of the day. He went to the Pain clinic the day before, referred by PCP, but was told that they do not treat the face. Was recommended to see Dr. Sánchez for a possible sphenopalatine block for perherpetic neuralgia.    2/27/25 Patient presents to neuro clinic for follow up appt. 52 yo M, with DMII, insulin dependent, diabetic retinopathy and macular edema, b/l hearing loss, HLD, low BP, who had presented to the hospital for shingles May 2024, and then admitted again a second time for intractable pain. He reports some minimal improvement in his L face numbness and pain and paresthesias, but is still bothered by the symptoms. Patient states he feels stabbing sensation in his L eye at times and he has difficulty with speech because his L lip feels numb. Patient has been following with PCP for diabetes and ophthalmology for eye symptoms. Patient is currently on atorvastatin 10mg, midodrine 5mg, and gabapentin 300mg 2 pills twice a day. Patient reports gabapentin helps somewhat with his symptoms but he doesn't want to increase the gabapentin further. He is open to trying a second medication. Patient also reports some stiffness with turning his neck to the left side.  Follow up Neurology Visit 10/24/2024 Pt reports slight improvement in both L facial numbness (most prominent in L V2) and L facial pain (it comes and goes, is electric shock like, comes more often at night than during day), but both features are still present. Pt is taking Gabapentin 600mg BID, he states it makes him somewhat drowsy, but he is happy to continue that current regimen and would not like to change it at this time. No new interval neurologic complaints.  Initial Neurology Visit 8/22/2024 53y M with Hx b/l hearing loss (wears aids), DM2 (insulin dependent, A1c 5/2024 10.0), WPW, and post-herpetic L facial neuralgia. Patient has not seen doctor for many years and was non-compliant with insulin. Presented to hospital on 5/27/24 for L facial pain. Started as a pimple below the L eye and then painful rash spread onto L cheek (V2), L forehead (V1), and L temporal scalp. He describes it as a constant throbbing behind the L eye with electric shocks into the cheek, forehead, and scalp. He also has pain in L eye when he looks left or right. He endorses numbness below the L eye and along the L jawline. He was treated with IV acyclovir, followed by PO valcyclovir, then discharged. However, he presented to hospital and was admitted a second time due to intractable pain. Started on gabapentin 300mg TID and eye drops. He was also discharged on midodrine 5mg TID (for idiopathic orthostatic hypotension), daily multivitamin, Tresiba 10u qHS, and Novolog. He presented to [facility] care with internist Dr Childers on 7/12/24 and was started on Lipitor 10mg daily. Referred for colonoscopy, serum studies, endocrinology, and neurology evaluations. Since the start of the herpes infection, he endorses intermittent blurriness in the L eye. Lasts for hours. He does wear corrective glasses at baseline. He states that the L facial pain interferes with his sleep and is only partially relieved by gabapentin or Tylenol.  He denies any surgeries, allergies to medications, smoking/alcohol use. He lives alone and is unemployed. Formerly worked for [employer].

## 2025-06-13 NOTE — HISTORY OF PRESENT ILLNESS
[FreeTextEntry1] : 6/12/25 Patient presents to neuro clinic for follow up appointment. Continues to demonstrate L V1-V3 facial pain which affects him daily. He is unable to sleep on his left side and speaking to others continues to be difficult for him. Currently feels like gabapentin and duloxetine are helping him a little bit with the frequency of the pain but feels like they come back in the middle of the day. He went to the Pain clinic the day before, referred by PCP, but was told that they do not treat the face. Was recommended to see Dr. Sánchez for a possible sphenopalatine block for perherpetic neuralgia.    2/27/25 Patient presents to neuro clinic for follow up appt. 52 yo M, with DMII, insulin dependent, diabetic retinopathy and macular edema, b/l hearing loss, HLD, low BP, who had presented to the hospital for shingles May 2024, and then admitted again a second time for intractable pain. He reports some minimal improvement in his L face numbness and pain and paresthesias, but is still bothered by the symptoms. Patient states he feels stabbing sensation in his L eye at times and he has difficulty with speech because his L lip feels numb. Patient has been following with PCP for diabetes and ophthalmology for eye symptoms. Patient is currently on atorvastatin 10mg, midodrine 5mg, and gabapentin 300mg 2 pills twice a day. Patient reports gabapentin helps somewhat with his symptoms but he doesn't want to increase the gabapentin further. He is open to trying a second medication. Patient also reports some stiffness with turning his neck to the left side.  Follow up Neurology Visit 10/24/2024 Pt reports slight improvement in both L facial numbness (most prominent in L V2) and L facial pain (it comes and goes, is electric shock like, comes more often at night than during day), but both features are still present. Pt is taking Gabapentin 600mg BID, he states it makes him somewhat drowsy, but he is happy to continue that current regimen and would not like to change it at this time. No new interval neurologic complaints.  Initial Neurology Visit 8/22/2024 53y M with Hx b/l hearing loss (wears aids), DM2 (insulin dependent, A1c 5/2024 10.0), WPW, and post-herpetic L facial neuralgia. Patient has not seen doctor for many years and was non-compliant with insulin. Presented to hospital on 5/27/24 for L facial pain. Started as a pimple below the L eye and then painful rash spread onto L cheek (V2), L forehead (V1), and L temporal scalp. He describes it as a constant throbbing behind the L eye with electric shocks into the cheek, forehead, and scalp. He also has pain in L eye when he looks left or right. He endorses numbness below the L eye and along the L jawline. He was treated with IV acyclovir, followed by PO valcyclovir, then discharged. However, he presented to hospital and was admitted a second time due to intractable pain. Started on gabapentin 300mg TID and eye drops. He was also discharged on midodrine 5mg TID (for idiopathic orthostatic hypotension), daily multivitamin, Tresiba 10u qHS, and Novolog. He presented to [facility] care with internist Dr Childers on 7/12/24 and was started on Lipitor 10mg daily. Referred for colonoscopy, serum studies, endocrinology, and neurology evaluations. Since the start of the herpes infection, he endorses intermittent blurriness in the L eye. Lasts for hours. He does wear corrective glasses at baseline. He states that the L facial pain interferes with his sleep and is only partially relieved by gabapentin or Tylenol.  He denies any surgeries, allergies to medications, smoking/alcohol use. He lives alone and is unemployed. Formerly worked for [employer].